# Patient Record
(demographics unavailable — no encounter records)

---

## 2025-05-29 NOTE — HISTORY OF PRESENT ILLNESS
[FreeTextEntry1] : 6/3/16 Hospital Course: 5 y/o M with hx benign heart murmur, presents with 3 episodes of seizure like activity x 1 day. As per parents, this morning, patient was in parents' bedroom and was found sitting on the floor by father. Patient was unresponsive and staring with one extension and stiffening. Patient taken to kitchen and laid down on floor and EMS called; brought to [facility]. Denies incontinence, tongue biting, foaming of mouth. had post ictal phase ~15min of sleeping, D stcik was WNL. Then discharged and went to pediatrician who referred them to a neurologist, Dr. Panda. Saw Dr. Panda today who believed this was a seizure, but was back to baseline gave them outpatient referral for MRI and EEG.  However, after returning home patient had another episode of seizure like activity with similar symptoms. Pt was sitting on the couch with father when he put his arm over his eyes, whimpered/teeth chattering then had unresponsive episode with staring, head and body twisted to the right, with right arm extension and stiffening. No shaking noted. No incontinence. No tongue biting. +/- foam at mouth, post ictal 15-30 mins descirbed sleepy He was then transferred here. Had URI a few weeks ago, but no fevers, no recent illnesses. Denies any trauma or possible ingestion. 2 weeks ago was on albuterol for wheezing, coughing, nasal congestion, which resolved about 10 days ago. For 1 month patient has been having breif episodes of staring, smiling, gigling covering his eyes with his arm then returns to baseline lasting in total 15 secs, he has been doing this 1-2x/week. In ED he has a head CT WNL, had another seizure which was witness involving eye deviation to the left with head tilting with shaking. PGM stays with family on weekends and takes:  No PMH. Normal growth and development. No meds. No allergies. IUTD.  BHx: , FT, no complications. FH: Maternal grandfather with migraines. Mom has a history of 1 seizure 2 years ago which involved she is maintained on lamotrigine, and has a hx caverbnous hemangioma. Whole body shaking, no loss of continence, but was post ictal. mothers familymuscular dystrophy, ALS, [place] 3 course 6/3-2015  Patient was admitted under neurology service. EEG was normal but was continued on treatment for clinical seizures.  Patient was discharged with Keppra 200 mg BID and vitamin B6. Will follow up with neuro Dr. Panda, private neurologist as outpatient.   ----------------  16 Hospital Course: Patient is a 5 year old male with pmhx of seizure disorder dx on  is admitted for increased seizure frequency. The patient has been on keppra since July with the initial diagnosis. The patient persisted having 3-4 seizures per week while on keppra. The patient was started on Lamictal to prevent these breakthrough seizures in the beginning of December. The patient developed full skin rash with the drug. The patient was d/c off of it and was awaiting evaluation with Dr. panda when he had 6 seizures within 2h. Each consistent with typical seizures, lasting 20-30 min. Parents have diastat at home but did not give it. Has had some congestion no fevers n/v/d. [facility] [place]SE: Vitals - T 36.8 HR 94 BP 95/64 RR 22 SPO2 100% RA.  pt returning to baseline in ED, and on keppra with normal neuro exam:cbc, cmp, tox serum and urine and will admit under neuro for EEG evalaution.  Floor course: (-) VEEG showed left spikes that evolved into generalized spikes. He remained without seizures on the floor and vitals remained stable. Due to VEEG findings, decision made to start depakote and wean keppra. He is to start depakote 200mg BID and decrease keppra to 750mg BID in 1 week and has neurology follow up. He tolerated PO and had stable neuro exam. PMD contacted and a message was left concerning hospital course and disposition.   ------------  17  Hospital followup - transferring care from Dr Voss. No seizures since discharge. restless at night. No side effects from the medication. No other physical complaint. Assessment/Plan: Recommended CBC metabolic panel levels. We'll adjust based on levels whether to decrease Keppra and increase Depakote. Awaiting official MRI from 2016 from outside. F/U 3 months  17  here with mom - no seizures since last visit. No medication side effects. Doing well. On vpa 250mg /5mL, 4mLs BID, Keppra 650mg BID  2017: with mom - no seizures since 2016. No medication side effects. Doing well. On VPA 250mg /5mL, 4mLs BID, Keppra 650mg BID  2018: with mom - no seizures since 2016. No medication side effects. Doing well. On VPA 250mg /5mL, 4mLs BID, Keppra 650mg BID. Fidgety in school.   2018: with father. No seizures since 2016. No medication side effects. Doing well. On VPA 250mg /5mL, 4mLs BID, Keppra 650mg BID. Fidgety in school.   5/15/2019: with father. No seizures since 2016. Father showed home video recently taken child laughing during sleep. No medication side effects. Doing well. On VPA 250mg /5mL, 4mLs BID, Keppra 650mg BID. Fidgety in school.    2020 with mother in a Telehealth visit. . No seizures since 2016.  No medication side effects. Doing well. On VPA 250mg /5mL, 4mLs BID, Keppra 650mg BID. Fidgety in school.   10/7/2020 with mother in a Telehealth visit. . No seizures since 2016.  No medication side effects. Doing well. On VPA 250mg /5mL, 4mLs BID, Keppra 650mg BID. Fidgety in school. AEEG in 20: left  hemisphere spikes   2021 with mother in a Telehealth visit. . No seizures since 2016.  No medication side effects. Doing well. On VPA 250mg /5mL, 4mLs BID, Keppra 650mg BID. Fidgety in school. AEEG in 20: left  hemisphere spikes. No change from last visit.    2021 with mother in a Telehealth visit.  No seizures since 2016.  No medication side effects. Doing well. On VPA 250mg /5mL, 4mLs BID, Keppra 650mg BID. Fidgety in school. AEEG in 20: left  hemisphere spikes. No change from last visit   11/10/2021 with mother in a Telehealth visit.  No seizures since 2016.  No medication side effects. Doing well. On VPA 250mg /5mL, 4mLs BID, Keppra 650mg BID. Fidgety in school. AEEG in 20: left  hemisphere spikes. No change from last visit   7/15/2022  with mother.  No seizures since 2016.  No medication side effects. Doing well. On VPA 250mg /5mL, 2ML  BID, Keppra 100mg.ML, 7.5ML BID. Fidgety in school in an integrated class. Has difficulty maintaining proper attention in school.  AEEG in 20: left  hemisphere spikes. No change from last visit   3/14/2023  with MOC.  No seizures since 2016.  No medication side effects. Doing well. on Keppra 100mg.ML, 7.5ML BID. Fidgety in school in an integrated class. Has difficulty maintaining proper attention in school. Receives accommodations.  AEEG in 20: left  hemisphere spikes. No change from last visit   10/12/2023 with MOC.  No seizures since 2016.  No medication side effects. Doing well. on Keppra 100mg.ML, 7.5ML BID. Fidgety in school in an integrated class. Has difficulty maintaining proper attention in school. Receives accommodations.  AEEG in 23: a single generalized spikes  20: left  hemisphere spikes. No change from last visit   2024  With the MOC. No seizures since 2016.  No medication side effects. Doing well. on Keppra  ER 750mg , 2 tabs daily.  AEEG in 23: a single generalized spikes  20: left  hemisphere spikes. No change from last visit   2025 with the mother. Over the last week had 3 seizure episodes: turning head to the right, Confused, not talking, <1 minute long. MOC few episodes of facial quivering, jaw jerking. Keppra level was 29. Increased with adding Keppra ER 500mg daily. Also Vimpat 0.88ML BID was also prescribed.

## 2025-05-29 NOTE — DATA REVIEWED
[FreeTextEntry1] : 12/21/16 VEEG\par  \par  Classification:  Doug and wave left fronto polar and bisynchronous. .  \par  \par  Impression:  The findings indicate the presence of a potentially epileptogenic focus over the left fronto polar head region. No seizures were recorded during the monitoring period.  \par  \par  -------------\par  \par  6/4/16 VEEG\par  \par  Impression: This is a NORMAL video EEG in the awake, drowsy and asleep states. There were no persistent asymmetries, focal abnormalities or epileptiform discharges. The push button events did not correlate with abnormal EEG changes\par  \par  ------------\par  \par  CT  HEAD  WITHOUT  CONTRAST\par  jordan    3  2016\par  \par  There  is  no  hydrocephalus,  mass  effect,  midline  shift,  or  acute  intracranial hemorrhage.    There  is  no  CT  evidence  of  acute  territorial  infarct.  There  is mild  white  matter  microvascular  ischemic  disease. The  visualized  paranasal  sinuses  and  mastoid  air  cells  are  clear.\par  \par  IMPRESSION:  No  acute  intracranial  abnormality.  Consider  eventual  MRI follow-up  for  further  workup  of  seizures  if  clinically  warranted.

## 2025-05-29 NOTE — CONSULT LETTER
[Dear  ___] : Dear  [unfilled], [Courtesy Letter:] : I had the pleasure of seeing your patient, [unfilled], in my office today. [Please see my note below.] : Please see my note below. [Sincerely,] : Sincerely, [FreeTextEntry3] : Dr. Diaz

## 2025-05-29 NOTE — DISCUSSION/SUMMARY
[FreeTextEntry1] : Returned call for answering service. Spoke to [facility] over the phone. Pt had a weird symptom at school, felt buzzing on one side of the head, then tongue felt numb, and couldn't talk and couldn't see out of eye for less than a minute. No LOC, no headache, or shaking, no loss of tone. Pt was immediately back to baseline. Unsure if he had headache at the time. Now back home after school, remains well. Has been compliant on keppra monotherapy. Family is traveling out of state for family event, [facility] is inquiring if pt can travel. Discussed possible pt may have experienced a focal seizure, however seems to have been brief. At this time would recommend continuing current keppra as it is at good therapeutic range for pt's weight at home (34kg). Advised can travel but would take seizure precaution including carrying pt's rescue medication in person in case pt suffers a GTC, at which point pt should be taken to closet ED. Advised closer follow up with primary neurologist afterwards within a week. MOC in agreement with plan, will call back with any other questions or concerns.

## 2025-05-29 NOTE — BIRTH HISTORY
[At Term] : at term [place] [Normal Vaginal Route] : by normal vaginal route [None] : there were no delivery complications [FreeTextEntry1] : 7 lbs 12 oz

## 2025-05-29 NOTE — PHYSICAL EXAM
[Cranial Nerves Oculomotor (III)] : extraocular motion intact [Cranial Nerves Trigeminal (V)] : facial sensation intact symmetrically [Cranial Nerves Facial (VII)] : face symmetrical [Cranial Nerves Vestibulocochlear (VIII)] : hearing was intact bilaterally [Cranial Nerves Glossopharyngeal (IX)] : tongue and palate midline [Cranial Nerves Accessory (XI - Cranial And Spinal)] : head turning and shoulder shrug symmetric [Cranial Nerves Hypoglossal (XII)] : there was no tongue deviation with protrusion [Normal] : patient has a normal gait including toe-walking, heel-walking and tandem walking. Romberg sign is negative. [de-identified] : Normal fundic exam  [de-identified] : No dysmetria [de-identified] : Normal gait.

## 2025-07-03 NOTE — PHYSICAL EXAM
[Well-appearing] : well-appearing [Normocephalic] : normocephalic [No dysmorphic facial features] : no dysmorphic facial features [No ocular abnormalities] : no ocular abnormalities [Neck supple] : neck supple [Lungs clear] : lungs clear [Heart sounds regular in rate and rhythm] : heart sounds regular in rate and rhythm [Soft] : soft [No organomegaly] : no organomegaly [No abnormal neurocutaneous stigmata or skin lesions] : no abnormal neurocutaneous stigmata or skin lesions [Straight] : straight [No sherif or dimples] : no sherif or dimples [No deformities] : no deformities [Alert] : alert [Well related, good eye contact] : well related, good eye contact [Conversant] : conversant [Normal speech and language] : normal speech and language [Follows instructions well] : follows instructions well [VFF] : VFF [Pupils reactive to light and accommodation] : pupils reactive to light and accommodation [Full extraocular movements] : full extraocular movements [No nystagmus] : no nystagmus [No papilledema] : no papilledema [Normal facial sensation to light touch] : normal facial sensation to light touch [No facial asymmetry or weakness] : no facial asymmetry or weakness [Gross hearing intact] : gross hearing intact [Equal palate elevation] : equal palate elevation [Good shoulder shrug] : good shoulder shrug [Normal tongue movement] : normal tongue movement [Midline tongue, no fasciculations] : midline tongue, no fasciculations [Normal axial and appendicular muscle tone] : normal axial and appendicular muscle tone [Gets up on table without difficulty] : gets up on table without difficulty [No pronator drift] : no pronator drift [Normal finger tapping and fine finger movements] : normal finger tapping and fine finger movements [No abnormal involuntary movements] : no abnormal involuntary movements [5/5 strength in proximal and distal muscles of arms and legs] : 5/5 strength in proximal and distal muscles of arms and legs [Walks and runs well] : walks and runs well [Able to do deep knee bend] : able to do deep knee bend [Able to walk on heels] : able to walk on heels [Able to walk on toes] : able to walk on toes [2+ biceps] : 2+ biceps [Triceps] : triceps [Knee jerks] : knee jerks [Ankle jerks] : ankle jerks [No ankle clonus] : no ankle clonus [Localizes LT and temperature] : localizes LT and temperature [No dysmetria on FTNT] : no dysmetria on FTNT [Normal gait] : normal gait [Able to tandem well] : able to tandem well

## 2025-07-03 NOTE — ASSESSMENT
[FreeTextEntry1] : In the past variety of medications were used  without success to stop daily seizures.  It was reported that Phenytoin causes itching but stopped the seizures. Clobazam was not tried

## 2025-07-03 NOTE — HISTORY OF PRESENT ILLNESS
[FreeTextEntry1] : 6/3/2025  Hospital note Tacos is a 14 y/o male, with history of known seizure disorder, brought to the ED due to breakthrough seizure activity. He was diagnosed with epilepsy approximately 8-9 years ago, treated with Keppra and Depakote, and remained seizure free until last month. Depakote was discontinued a few years ago. Vimpat was added a 2 weeks ago due to increase in seizure activity. Overnight on vEEG he had numerous brief, focal electroclinical and electrographic seizures captured, with onset over left hemisphere, maximal left posterior region. He was loaded with Depakote 1000mg and Vimpat was discontinued. Will continue to monitor on vEEG and plan to obtain MRI brain under epilepsy protocol.  6/6/VEEG: mpression: This is an abnormal video EEG study: - Several brief, focal electroclinical and electrographic seizures captured, with onset over left hemisphere, maximal left posterior region. Significant improvement in frequency and duration of captured episodes compared to prior day's recording.  7/3/2025  with the parents. Now on VPA  250mg, 3 tabs BID and Aptiom 400mg, 2.5 tabs at bed time. Been seizure free during the day since 6/15/2025. Parents reported fatigue.    Clinical Correlation: Findings are diagnostic of a focal epilepsy, over left posterior region. Improvement from prior day's recording with less frequent and briefer seizures captured.

## 2025-07-03 NOTE — END OF VISIT
[Time Spent: ___ minutes] : I have spent [unfilled] minutes of time on the encounter which excludes teaching and separately reported services. Admission Reconciliation is Completed  Discharge Reconciliation is Completed

## 2025-07-30 NOTE — HISTORY OF PRESENT ILLNESS
[FreeTextEntry1] :  Tacos is a 12 y/o male, with history of known seizure disorder, here for assessment of a  new murmur and "[id number] variability on single lead EKG" noted on his most recent EEG for seizures. No cardiac symptoms reported. Focal seizures. No syncope reported.  There has been no chest pain, palpitations, excessive diaphoresis, shortness of breath or syncope. He seems more tired lately since starting on Aptiom and Depakote for breakthrough seizures. Prior to this no activity intolerance. He likes to play soccer and can still keep up. Weight in 1st percentile since 2024 (was 4%percentil 2023)  ROS otherwise negative.   Meds: Eslicarbazepine (aptiom) - AED- can cause HTN and MD interval prolongation  Depakote - AED - can cause BP issues and 1% cardiac dysrhythmias

## 2025-07-30 NOTE — CONSULT LETTER
[Dear  ___:] : Dear Dr. [unfilled]: [DrJerardo  ___] : Dr. DOWNEY [FreeTextEntry4] : Frederick Diaz MD [FreeTextEntry5] : [address], [place] [address] [de-identified] : Marie Parkinson MD, MPH Pediatric Cardiologist Pediatric Intensivist  of Pediatrics [employer] [facility] [phone] [place], [place] [id number] [phone]

## 2025-07-30 NOTE — CARDIOLOGY SUMMARY
[FreeTextEntry1] :  Normal sinus rhythm with sinus arrhythmia (normal respiratory variation of heart rate), normal QRS axis, normal intervals ( NE ~ 120msec QTc ~  422 msec), no hypertrophy, no pre-excitation, no ST segment or T wave abnormalities. Normal EKG.  [de-identified] : 7/25/25 [FreeTextEntry2] : Summary: 1. Redundant tissue of the anterior mitral valve leaflet is seen. (no prolapse) 2. Trivial mitral valve regurgitation. 3. Normal left ventricular size, morphology and systolic function. 4. Normal right ventricular morphology with qualitatively normal size and systolic function. 5. No pericardial effusion.

## 2025-07-30 NOTE — DISCUSSION/SUMMARY
[FreeTextEntry1] :  Tacos is a 14 y/o male, with history of known seizure disorder, here for assessment of a  new murmur and "[id number] variability on single lead EKG" noted on his most recent EEG for seizures. His murmur is a classic innocent Still's murmur. The cardiac history, physical exam, and EKG, and echocardiogram are normal and reassuring. We discussed that innocent murmurs are not related to cardiac pathology, and may get louder during times of illness or fever. They may resolve, or they may persist throughout life, but are of no clinical consequence.  In regards to  "amhv-mz-ghhe variability on single lead EKG" noted on his EEG this was likely sinus arrythmia which was also noted on out EKG today. This is a normal rhythm specifically normal respiratory variation of heart rate. He does not have palpitations. EKG today was normal with normal intervals and no side effects of antiepliectics. [place] interval ~ 120msec.   Incidentally  Redundant tissue of the anterior mitral valve leaflet is seen. (no prolapse which his father has) The valve functions well-- no significant stenosis or regurgitation. This is a normal variant finding.    From a cardiac standpoint there are no physical limitations whatsoever, no contraindications to any medications or vaccinations she should require, no cardiac contraindications to anesthesia or surgical procedures (with no specific anesthesia cardiac considerations), and no requirement for SBE prophylaxis prior to procedures.  No further cardiology follow-up is required unless new symptoms or concerns arise.     [Needs SBE Prophylaxis] : [unfilled] does not need bacterial endocarditis prophylaxis [May participate in all age-appropriate activities] : [unfilled] May participate in all age-appropriate activities.

## 2025-07-30 NOTE — HISTORY OF PRESENT ILLNESS
[FreeTextEntry1] :  Tacos is a 14 y/o male, with history of known seizure disorder, here for assessment of a  new murmur and "[id number] variability on single lead EKG" noted on his most recent EEG for seizures. No cardiac symptoms reported. Focal seizures. No syncope reported.  There has been no chest pain, palpitations, excessive diaphoresis, shortness of breath or syncope. He seems more tired lately since starting on Aptiom and Depakote for breakthrough seizures. Prior to this no activity intolerance. He likes to play soccer and can still keep up. Weight in 1st percentile since 2024 (was 4%percentil 2023)  ROS otherwise negative.   Meds: Eslicarbazepine (aptiom) - AED- can cause HTN and DC interval prolongation  Depakote - AED - can cause BP issues and 1% cardiac dysrhythmias

## 2025-07-30 NOTE — DISCUSSION/SUMMARY
[FreeTextEntry1] :  Tacos is a 14 y/o male, with history of known seizure disorder, here for assessment of a  new murmur and "[id number] variability on single lead EKG" noted on his most recent EEG for seizures. His murmur is a classic innocent Still's murmur. The cardiac history, physical exam, and EKG, and echocardiogram are normal and reassuring. We discussed that innocent murmurs are not related to cardiac pathology, and may get louder during times of illness or fever. They may resolve, or they may persist throughout life, but are of no clinical consequence.  In regards to  "[id number]-xu-[id number] variability on single lead EKG" noted on his EEG this was likely sinus arrythmia which was also noted on out EKG today. This is a normal rhythm specifically normal respiratory variation of heart rate. He does not have palpitations. EKG today was normal with normal intervals and no side effects of antiepliectics. [place] interval ~ 120msec.   Incidentally  Redundant tissue of the anterior mitral valve leaflet is seen. (no prolapse which his father has) The valve functions well-- no significant stenosis or regurgitation. This is a normal variant finding.    From a cardiac standpoint there are no physical limitations whatsoever, no contraindications to any medications or vaccinations she should require, no cardiac contraindications to anesthesia or surgical procedures (with no specific anesthesia cardiac considerations), and no requirement for SBE prophylaxis prior to procedures.  No further cardiology follow-up is required unless new symptoms or concerns arise.     [Needs SBE Prophylaxis] : [unfilled] does not need bacterial endocarditis prophylaxis [May participate in all age-appropriate activities] : [unfilled] May participate in all age-appropriate activities.

## 2025-07-30 NOTE — PHYSICAL EXAM
[General Appearance - Alert] : alert [General Appearance - In No Acute Distress] : in no acute distress [General Appearance - Well Nourished] : well nourished [General Appearance - Well Developed] : well developed [General Appearance - Well-Appearing] : well appearing [Appearance Of Head] : the head was normocephalic [Facies] : there were no dysmorphic facial features [Sclera] : the conjunctiva were normal [Outer Ear] : the ears and nose were normal in appearance [Examination Of The Oral Cavity] : mucous membranes were moist and pink [Auscultation Breath Sounds / Voice Sounds] : breath sounds clear to auscultation bilaterally [Respiration, Rhythm And Depth] : normal respiratory rhythm and effort [Normal Chest Appearance] : the chest was normal in appearance [Apical Impulse] : quiet precordium with normal apical impulse [Heart Rate And Rhythm] : normal heart rate and rhythm [Heart Sounds] : normal S1 and S2 [No Murmur] : no murmurs  [Heart Sounds Gallop] : no gallops [Heart Sounds Pericardial Friction Rub] : no pericardial rub [Edema] : no edema [Arterial Pulses] : normal upper and lower extremity pulses with no pulse delay [Heart Sounds Click] : no clicks [Systolic] : systolic [Capillary Refill Test] : normal capillary refill [II] : a grade 2/6 [LMSB] : LMSB  [Ejection] : ejection [Low] : low pitched [Musical] : musical [Bowel Sounds] : normal bowel sounds [Abdomen Soft] : soft [Nondistended] : nondistended [Abdomen Tenderness] : non-tender [Nail Clubbing] : no clubbing  or cyanosis of the fingers [Motor Tone] : normal muscle strength and tone [Cervical Lymph Nodes Enlarged Anterior] : The anterior cervical nodes were normal [Cervical Lymph Nodes Enlarged Posterior] : The posterior cervical nodes were normal [] : no rash [Skin Color & Pigmentation] : normal skin color and pigmentation [Demonstrated Behavior - Infant Nonreactive To Parents] : interactive [Mood] : mood and affect were appropriate for age [Demonstrated Behavior] : normal behavior

## 2025-07-30 NOTE — CARDIOLOGY SUMMARY
[FreeTextEntry1] :  Normal sinus rhythm with sinus arrhythmia (normal respiratory variation of heart rate), normal QRS axis, normal intervals ( OR ~ 120msec QTc ~  422 msec), no hypertrophy, no pre-excitation, no ST segment or T wave abnormalities. Normal EKG.  [de-identified] : 7/25/25 [FreeTextEntry2] : Summary: 1. Redundant tissue of the anterior mitral valve leaflet is seen. (no prolapse) 2. Trivial mitral valve regurgitation. 3. Normal left ventricular size, morphology and systolic function. 4. Normal right ventricular morphology with qualitatively normal size and systolic function. 5. No pericardial effusion.

## 2025-07-30 NOTE — CONSULT LETTER
[Dear  ___:] : Dear Dr. [unfilled]: [DrJerardo  ___] : Dr. DOWNEY [FreeTextEntry4] : Frederick Diaz MD [FreeTextEntry5] : [address], [place] [address] [de-identified] : Marie Parkinson MD, MPH Pediatric Cardiologist Pediatric Intensivist  of Pediatrics [employer] [facility] [phone] [place], [place] [id number] [phone]